# Patient Record
Sex: FEMALE | Race: ASIAN | Employment: UNEMPLOYED | URBAN - METROPOLITAN AREA
[De-identification: names, ages, dates, MRNs, and addresses within clinical notes are randomized per-mention and may not be internally consistent; named-entity substitution may affect disease eponyms.]

---

## 2017-11-09 ENCOUNTER — ALLSCRIPTS OFFICE VISIT (OUTPATIENT)
Dept: OTHER | Facility: OTHER | Age: 57
End: 2017-11-09

## 2017-11-10 NOTE — PROGRESS NOTES
Assessment    1  High blood pressure (401 9) (I10)   2  Pre-operative clearance (V72 84) (Z01 818)    Plan  Pre-operative clearance    · Follow-up visit in 6 months Evaluation and Treatment  Follow-up  Status: Hold For - Scheduling Requested for: [id number]    Discussion/Summary  Discussion Summary:   Preoperative clearance: Patient physically seems to be quite stable with normal blood pressure normal vitals normal physical examination  She is supposed to have blood work EKG and chest x-ray done today  I will get copy of the blood work  But based on examination today she is medically low risk for the total hysterectomy  very well control  fibroid: For total hysterectomy  advised her to continue blood pressure medication  Avoid any aspirin due to 5 days before surgery  is medically low risk for hysterectomy  Once I get blood work EKG and chest x-ray if something is different I'll convey it to you  you very much for the consultation  Counseling Documentation With Imm: The patient was counseled regarding diagnostic results  Medication SE Review and Pt Understands Tx: Possible side effects of new medications were reviewed with the patient/guardian today  Chief Complaint  Chief Complaint Free Text Note Form: Debrah Dubin came in today for medical clearance for further hysterectomy surgery      History of Present Illness  HPI: 49-year-old woman with main probable hypertensive was found to have uterine fibroid and need total hysterectomy so she is here for the medical clearance  is feeling quite well denies any complaint  Taking medicine regularly      Review of Systems  Complete-Female:  Constitutional: No fever, no chills, feels well, no tiredness, no recent weight gain or weight loss  Eyes: No complaints of eye pain, no red eyes, no eyesight problems, no discharge, no dry eyes, no itching of eyes  ENT: no complaints of earache, no loss of hearing, no nose bleeds, no nasal discharge, no sore throat, no hoarseness  Cardiovascular: No complaints of slow heart rate, no fast heart rate, no chest pain, no palpitations, no leg claudication, no lower extremity edema  Respiratory: No complaints of shortness of breath, no wheezing, no cough, no SOB on exertion, no orthopnea, no PND  Gastrointestinal: No complaints of abdominal pain, no constipation, no nausea or vomiting, no diarrhea, no bloody stools  Genitourinary: No complaints of dysuria, no incontinence, no pelvic pain, no dysmenorrhea, no vaginal discharge or bleeding  Musculoskeletal: No complaints of arthralgias, no myalgias, no joint swelling or stiffness, no limb pain or swelling  Integumentary: No complaints of skin rash or lesions, no itching, no skin wounds, no breast pain or lump  Psychiatric: Not suicidal, no sleep disturbance, no anxiety or depression, no change in personality, no emotional problems  Endocrine: No complaints of proptosis, no hot flashes, no muscle weakness, no deepening of the voice, no feelings of weakness  Hematologic/Lymphatic: No complaints of swollen glands, no swollen glands in the neck, does not bleed easily, does not bruise easily  ROS Reviewed:   ROS reviewed  Active Problems  1  Anemia due to blood loss (280 0) (D50 0)   2  DJD (degenerative joint disease), multiple sites (715 89) (M15 9)   3  Fatigue (780 79) (R53 83)   4  Head ache (784 0) (R51)   5  Heel spur (726 73) (M77 30)   6  Hemorrhoids (455 6) (K64 9)   7  High blood pressure (401 9) (I10)   8  Idiopathic eosinophilia (288 3) (D72 1)   9  Multiple joint pain (719 49) (M25 50)   10  Situational stress (V62 89) (F43 9)    Past Medical History  1  History of Abnormal uterine bleeding (626 9) (N93 9)   2  History of Benign essential hypertension (401 1) (I10)   3  History of Colonoscopy   4  History of DJD (degenerative joint disease) (715 90) (M19 90)  Active Problems And Past Medical History Reviewed:    The active problems and past medical history were reviewed and updated today  Surgical History  1  Denied: History Of Prior Surgery  Surgical History Reviewed: The surgical history was reviewed and updated today  Family History  Mother    1  Family history of Hypertension  Father    2  Family history of hypertension (V17 49) (Z82 49)   3  Family history of renal failure (V18 69) (Z84 1)  Family History Reviewed: The family history was reviewed and updated today  Social History     · Active advance directive (V49 89) (Z78 9)   · Employed   · Exercises moderately 3 or more times a week   · Exercises occasionally (V49 89) (Z78 9)   ·    · Never a smoker   · Never used chewing tobacco (V49 89) (Z78 9)   · No advance directives (V49 89) (Z78 9)   · No alcohol use   · Non-smoker (V49 89) (Z78 9)   · Occasional caffeine consumption   · Occupation   · Two children  Social History Reviewed: The social history was reviewed and updated today  Current Meds   1  Glucosamine-Chondroitin--300-250 MG TABS; Take 1 tablet daily; Therapy: (Recorded:[id number]) to Recorded   2  Metoprolol Succinate ER 50 MG Oral Tablet Extended Release 24 Hour; TAKE 1 TABLET DAILY; Therapy: [id number] to (Evaluate:09Jun2018); Last Rx:14Jun2017 Ordered   3  Multivitamins TABS; TAKE 1 TABLET DAILY; Therapy: (Adan Angel) to Recorded   4  Vitamin D TABS; Take 1 tablet daily; Therapy: (Recorded:28Jun2016) to Recorded  Medication List Reviewed: The medication list was reviewed and updated today  Allergies  1  No Known Drug Allergies    Vitals  Vital Signs    Recorded: [id number] 11:06AM Recorded: 72SCU0191 10:45AM   Temperature  97 5 F   Heart Rate 78, Apical    Pulse Quality Normal, Apical    Respiration Quality Normal    Respiration 16    Height  5 ft 1 in   Weight  160 lb 8 oz   BMI Calculated  30 33   BSA Calculated  1 72       Physical Exam   Constitutional  General appearance: No acute distress, well appearing and well nourished     Eyes  Conjunctiva and lids: No swelling, erythema or discharge  Pupils and irises: Equal, round and reactive to light  Ears, Nose, Mouth, and Throat  External inspection of ears and nose: Normal    Pulmonary  Respiratory effort: No increased work of breathing or signs of respiratory distress  Auscultation of lungs: Clear to auscultation  Cardiovascular  Auscultation of heart: Normal rate and rhythm, normal S1 and S2, without murmurs  Examination of extremities for edema and/or varicosities: Normal    Carotid pulses: Normal    Abdomen  Abdomen: Non-tender, no masses  Liver and spleen: No hepatomegaly or splenomegaly  Lymphatic  Palpation of lymph nodes in neck: No lymphadenopathy  Musculoskeletal  Gait and station: Normal    Psychiatric  Orientation to person, place, and time: Normal          Future Appointments    Date/Time Provider Specialty Site   01/02/2018 04:00 PM CONCETTA [place]  Ne[facility]rology [facility]   05/09/2018 04:30 PM CONCETTA Everett   Nephrology [facility]       Signatures   Electronically signed by : CONCETTA Aparicio ; Nov 9 2017 11:48AM EST                       (Author)

## 2018-01-12 NOTE — CONSULTS
Chief Complaint  Vinayak Chung came in today for medical clearance for further hysterectomy surgery      History of Present Illness  HPI: 27-year-old woman with main probable hypertensive was found to have uterine fibroid and need total hysterectomy so she is here for the medical clearance    She is feeling quite well denies any complaint  Taking medicine regularly      Review of Systems    Constitutional: No fever, no chills, feels well, no tiredness, no recent weight gain or weight loss  Eyes: No complaints of eye pain, no red eyes, no eyesight problems, no discharge, no dry eyes, no itching of eyes  ENT: no complaints of earache, no loss of hearing, no nose bleeds, no nasal discharge, no sore throat, no hoarseness  Cardiovascular: No complaints of slow heart rate, no fast heart rate, no chest pain, no palpitations, no leg claudication, no lower extremity edema  Respiratory: No complaints of shortness of breath, no wheezing, no cough, no SOB on exertion, no orthopnea, no PND  Gastrointestinal: No complaints of abdominal pain, no constipation, no nausea or vomiting, no diarrhea, no bloody stools  Genitourinary: No complaints of dysuria, no incontinence, no pelvic pain, no dysmenorrhea, no vaginal discharge or bleeding  Musculoskeletal: No complaints of arthralgias, no myalgias, no joint swelling or stiffness, no limb pain or swelling  Integumentary: No complaints of skin rash or lesions, no itching, no skin wounds, no breast pain or lump  Psychiatric: Not suicidal, no sleep disturbance, no anxiety or depression, no change in personality, no emotional problems  Endocrine: No complaints of proptosis, no hot flashes, no muscle weakness, no deepening of the voice, no feelings of weakness  Hematologic/Lymphatic: No complaints of swollen glands, no swollen glands in the neck, does not bleed easily, does not bruise easily  ROS reviewed  Active Problems    1   Anemia due to blood loss (280 0) (D50 0)   2  DJD (degenerative joint disease), multiple sites (715 89) (M15 9)   3  Fatigue (780 79) (R53 83)   4  Head ache (784 0) (R51)   5  Heel spur (726 73) (M77 30)   6  Hemorrhoids (455 6) (K64 9)   7  High blood pressure (401 9) (I10)   8  Idiopathic eosinophilia (288 3) (D72 1)   9  Multiple joint pain (719 49) (M25 50)   10  Situational stress (V62 89) (F43 9)    Past Medical History    · History of Abnormal uterine bleeding (626 9) (N93 9)   · History of Benign essential hypertension (401 1) (I10)   · History of Colonoscopy   · History of DJD (degenerative joint disease) (715 90) (M19 90)    The active problems and past medical history were reviewed and updated today  Surgical History    · Denied: History Of Prior Surgery    The surgical history was reviewed and updated today  Family History    · Family history of Hypertension    · Family history of hypertension (V17 49) (Z82 49)   · Family history of renal failure (V18 69) (Z84 1)    The family history was reviewed and updated today  Social History    · Active advance directive (V49 89) (Z78 9)   · No   · Employed   · [facility]   · Exercises moderately 3 or more times a week   · Exercises occasionally (V49 89) (Z78 9)   · Moderate   ·    · Never a smoker   · Never used chewing tobacco (V49 89) (Z78 9)   · No advance directives (V49 89) (Z78 9)   · No alcohol use   · Non-smoker (V49 89) (Z78 9)   · Occasional caffeine consumption   · Occupation   ·    · Two children  The social history was reviewed and updated today  Current Meds   1  Glucosamine-Chondroitin--300-250 MG TABS; Take 1 tablet daily; Therapy: (Recorded:03May2016) to Recorded   2  Metoprolol Succinate ER 50 MG Oral Tablet Extended Release 24 Hour; TAKE 1 TABLET   DAILY; Therapy: [id number] to (Evaluate:09Jun2018); Last Rx:14Jun2017 Ordered   3  Multivitamins TABS; TAKE 1 TABLET DAILY;    Therapy: (Recorded:03May2016) to Recorded   4  Vitamin D TABS; Take 1 tablet daily; Therapy: (Recorded:33Mdn9649) to Recorded    The medication list was reviewed and updated today  Allergies    1  No Known Drug Allergies    Vitals  Signs    Heart Rate: 78, ApicalPulse Quality: Normal, ApicalRespiration Quality: NormalRespiration: 16   Temperature: 97 5 FHeight: 5 ft 1 inWeight: 160 lb 8 ozBMI Calculated: 30 33BSA Calculated: 1 72    Physical Exam    Constitutional   General appearance: No acute distress, well appearing and well nourished  Eyes   Conjunctiva and lids: No swelling, erythema or discharge  Pupils and irises: Equal, round and reactive to light  Ears, Nose, Mouth, and Throat   External inspection of ears and nose: Normal     Pulmonary   Respiratory effort: No increased work of breathing or signs of respiratory distress  Auscultation of lungs: Clear to auscultation  Cardiovascular   Auscultation of heart: Normal rate and rhythm, normal S1 and S2, without murmurs  Examination of extremities for edema and/or varicosities: Normal     Carotid pulses: Normal     Abdomen   Abdomen: Non-tender, no masses  Liver and spleen: No hepatomegaly or splenomegaly  Lymphatic   Palpation of lymph nodes in neck: No lymphadenopathy  Musculoskeletal   Gait and station: Normal     Psychiatric   Orientation to person, place, and time: Normal          Assessment    1  High blood pressure (401 9) (I10)   2  Pre-operative clearance (V72 84) (Z01 818)    Plan     Pre-operative clearance    · Follow-up visit in 6 months Evaluation and Treatment  Follow-up  Status: Hold For -  Scheduling  Requested for: [id number]    Discussion/Summary    Preoperative clearance: Patient physically seems to be quite stable with normal blood pressure normal vitals normal physical examination  She is supposed to have blood work EKG and chest x-ray done today  I will get copy of the blood work   But based on examination today she is medically low risk for the total hysterectomy  Hypertension very well control    Uterine fibroid: For total hysterectomy    I advised her to continue blood pressure medication  Avoid any aspirin due to 5 days before surgery    She is medically low risk for hysterectomy  Once I get blood work EKG and chest x-ray if something is different I'll convey it to you    Thank you very much for the consultation  The patient was counseled regarding diagnostic results  Possible side effects of new medications were reviewed with the patient/guardian today        Signatures   Electronically signed by : CONCETTA Anderson ; Nov 9 2017 11:48AM EST                       (Author)

## 2018-01-15 VITALS
TEMPERATURE: 97.5 F | RESPIRATION RATE: 16 BRPM | WEIGHT: 160.5 LBS | HEART RATE: 78 BPM | BODY MASS INDEX: 30.3 KG/M2 | HEIGHT: 61 IN

## 2018-01-25 DIAGNOSIS — I10 HYPERTENSION, UNSPECIFIED TYPE: Primary | ICD-10-CM

## 2018-01-25 RX ORDER — METOPROLOL SUCCINATE 50 MG/1
50 TABLET, EXTENDED RELEASE ORAL DAILY
Qty: 90 TABLET | Refills: 0 | Status: SHIPPED | OUTPATIENT
Start: 2018-01-25 | End: 2018-05-14 | Stop reason: SDUPTHER

## 2018-01-25 RX ORDER — METOPROLOL SUCCINATE 50 MG/1
1 TABLET, EXTENDED RELEASE ORAL DAILY
COMMUNITY
Start: 2017-06-05 | End: 2018-01-25 | Stop reason: SDUPTHER

## 2018-01-31 ENCOUNTER — OFFICE VISIT (OUTPATIENT)
Dept: NEPHROLOGY | Facility: CLINIC | Age: 58
End: 2018-01-31
Payer: COMMERCIAL

## 2018-01-31 VITALS
BODY MASS INDEX: 29.85 KG/M2 | SYSTOLIC BLOOD PRESSURE: 120 MMHG | TEMPERATURE: 98.7 F | WEIGHT: 158 LBS | HEART RATE: 78 BPM | DIASTOLIC BLOOD PRESSURE: 70 MMHG

## 2018-01-31 DIAGNOSIS — I10 HYPERTENSION, UNSPECIFIED TYPE: Primary | ICD-10-CM

## 2018-01-31 DIAGNOSIS — R73.9 HYPERGLYCEMIA: ICD-10-CM

## 2018-01-31 PROBLEM — D25.9 UTERINE LEIOMYOMA: Status: ACTIVE | Noted: 2017-09-13

## 2018-01-31 PROCEDURE — 99214 OFFICE O/P EST MOD 30 MIN: CPT | Performed by: INTERNAL MEDICINE

## 2018-01-31 RX ORDER — SENNA AND DOCUSATE SODIUM 50; 8.6 MG/1; MG/1
1 TABLET, FILM COATED ORAL
COMMUNITY
Start: 2017-11-16

## 2018-01-31 NOTE — ASSESSMENT & PLAN NOTE
Blood pressure is very well control with metoprolol and she is tolerating medicine very well so I will continue at same dose with advised to monitor blood pressure at home    Continue diet and exercise and avoid salt

## 2018-01-31 NOTE — PROGRESS NOTES
INTERNAL MEDICINE OFFICE FOLLOW UP  Edwar Balderrama 59 y o  female MRN: [id number]    Encounter: [id number] 1/31/2018    REASON FOR VISIT: Edwar Balderrama is a 59 y o  female who is here on 1/31/2018 for No chief complaint on file  Aric [place] HPI:    HPI  Patient came in today for comprehensive physical examination  She is 53-year-old related early healthy young woman with problem high blood pressure  Doing quite well  Since her last visit she had required total hysterectomy because of fibroids  She tolerated surgery very well and other than mild abdominal pain she denies any complaint  REVIEW OF SYSTEMS:    Review of Systems   Constitutional: Negative for activity change and fatigue  HENT: Negative for congestion and ear discharge  Eyes: Negative for photophobia and pain  Respiratory: Negative for apnea and choking  Cardiovascular: Negative for chest pain and palpitations  Gastrointestinal: Negative for abdominal distention and blood in stool  Endocrine: Negative for heat intolerance and polyphagia  Genitourinary: Negative for flank pain and urgency  Musculoskeletal: Negative for neck pain and neck stiffness  Skin: Negative for color change and wound  Allergic/Immunologic: Negative for food allergies and immunocompromised state  Neurological: Negative for seizures and facial asymmetry  Hematological: Negative for adenopathy  Does not bruise/bleed easily  Psychiatric/Behavioral: Negative for self-injury and suicidal ideas           PAST MEDICAL HISTORY:  Past Medical History:   Diagnosis Date    Anemia     Hypertension        PAST SURGICAL HISTORY:  Past Surgical History:   Procedure Laterality Date    COLONOSCOPY N/A 2015    HYSTERECTOMY  11/2017       SOCIAL HISTORY:  History   Alcohol Use No     History   Drug Use No     History   Smoking Status    Never Smoker   Smokeless Tobacco    Never Used       FAMILY HISTORY:  Family History   Problem Relation Age of Onset    Hypertension Mother  Hypertension Father        MEDICATIONS:    Current Outpatient Prescriptions:     cholecalciferol (VITAMIN D3) 1,000 units tablet, Take 1 tablet by mouth daily, Disp: , Rfl:     Glucosamine-Chondroitin--300-250 MG TABS, Take 1 tablet by mouth daily, Disp: , Rfl:     metoprolol succinate (TOPROL-XL) 50 mg 24 hr tablet, Take 1 tablet by mouth daily, Disp: 90 tablet, Rfl: 0    Multiple Vitamin (MULTIVITAMINS PO), Take 1 tablet by mouth daily, Disp: , Rfl:     senna-docusate sodium (SENOKOT-S) 8 6-50 mg per tablet, Take 1 tablet by mouth, Disp: , Rfl:     PHYSICAL EXAM:  Vitals:    01/31/18 1556   BP: 120/70   BP Location: Right arm   Patient Position: Sitting   Pulse: 78   Temp: 98 7 °F (37 1 °C)   Weight: 71 7 kg (158 lb)     Body mass index is 29 85 kg/m²  Physical Exam   Constitutional: She is oriented to person, place, and time  She appears well-developed  No distress  HENT:   Head: Normocephalic  Mouth/Throat: Oropharynx is clear and moist    Eyes: Conjunctivae are normal  No scleral icterus  Neck: Neck supple  No JVD present  Cardiovascular: Normal rate and normal heart sounds  Pulmonary/Chest: Effort normal  She has no wheezes  Abdominal: Soft  There is no tenderness  Musculoskeletal: Normal range of motion  She exhibits no edema  Neurological: She is alert and oriented to person, place, and time  Skin: Skin is warm  No rash noted  Psychiatric: She has a normal mood and affect  Her behavior is normal        LAB RESULTS:  No results found for this or any previous visit  ASSESSMENT and PLAN:  High blood pressure  Blood pressure is very well control with metoprolol and she is tolerating medicine very well so I will continue at same dose with advised to monitor blood pressure at home  Continue diet and exercise and avoid salt    Uterine leiomyoma  Status post total hysterectomy    Still complaining of some abdominal pain but overall stable    DJD (degenerative joint disease), multiple sites  Quite asymptomatic does not require any pain killer    Hyperglycemia  Blood glucose was high last blood work maybe it was related to surgery or may be nonfasting  I will repeat blood work along with hemoglobin A1c for continuous monitoring      I will see her back in 6 month  She will get blood work and if there is any other complaints he will give me a call        Portions of the record may have been created with voice recognition software  Occasional wrong word or "sound a like" substitutions may have occurred due to the inherent limitations of voice recognition software  Read the chart carefully and recognize, using context, where substitutions have occurred  If you have any questions, please contact the dictating provider

## 2018-01-31 NOTE — ASSESSMENT & PLAN NOTE
Blood glucose was high last blood work maybe it was related to surgery or may be nonfasting    I will repeat blood work along with hemoglobin A1c for continuous monitoring

## 2018-04-02 LAB — HBA1C MFR BLD HPLC: 5.7 %

## 2018-05-14 DIAGNOSIS — I10 HYPERTENSION, UNSPECIFIED TYPE: ICD-10-CM

## 2018-05-14 RX ORDER — METOPROLOL SUCCINATE 50 MG/1
50 TABLET, EXTENDED RELEASE ORAL DAILY
Qty: 90 TABLET | Refills: 2 | Status: SHIPPED | OUTPATIENT
Start: 2018-05-14 | End: 2019-01-16 | Stop reason: SDUPTHER

## 2018-11-05 ENCOUNTER — TRANSCRIBE ORDERS (OUTPATIENT)
Dept: LAB | Facility: CLINIC | Age: 58
End: 2018-11-05

## 2018-11-05 ENCOUNTER — APPOINTMENT (OUTPATIENT)
Dept: LAB | Facility: CLINIC | Age: 58
End: 2018-11-05
Payer: COMMERCIAL

## 2018-11-05 DIAGNOSIS — I10 HYPERTENSION, UNSPECIFIED TYPE: ICD-10-CM

## 2018-11-05 DIAGNOSIS — R73.9 HYPERGLYCEMIA: ICD-10-CM

## 2018-11-05 DIAGNOSIS — I10 ESSENTIAL HYPERTENSION, MALIGNANT: Primary | ICD-10-CM

## 2018-11-05 DIAGNOSIS — I10 ESSENTIAL HYPERTENSION, MALIGNANT: ICD-10-CM

## 2018-11-05 LAB
ANION GAP SERPL CALCULATED.3IONS-SCNC: 4 MMOL/L (ref 4–13)
BACTERIA UR QL AUTO: ABNORMAL /HPF
BILIRUB UR QL STRIP: NEGATIVE
BUN SERPL-MCNC: 10 MG/DL (ref 5–25)
CALCIUM SERPL-MCNC: 9.1 MG/DL (ref 8.3–10.1)
CHLORIDE SERPL-SCNC: 102 MMOL/L (ref 100–108)
CHOLEST SERPL-MCNC: 169 MG/DL (ref 50–200)
CLARITY UR: CLEAR
CO2 SERPL-SCNC: 28 MMOL/L (ref 21–32)
COLOR UR: YELLOW
CREAT SERPL-MCNC: 0.74 MG/DL (ref 0.6–1.3)
ERYTHROCYTE [DISTWIDTH] IN BLOOD BY AUTOMATED COUNT: 12.8 % (ref 11.6–15.1)
EST. AVERAGE GLUCOSE BLD GHB EST-MCNC: 131 MG/DL
GFR SERPL CREATININE-BSD FRML MDRD: 88 ML/MIN/1.73SQ M
GLUCOSE P FAST SERPL-MCNC: 101 MG/DL (ref 65–99)
GLUCOSE UR STRIP-MCNC: NEGATIVE MG/DL
HBA1C MFR BLD: 6.2 % (ref 4.2–6.3)
HCT VFR BLD AUTO: 40.1 % (ref 34.8–46.1)
HDLC SERPL-MCNC: 47 MG/DL (ref 40–60)
HGB BLD-MCNC: 12.3 G/DL (ref 11.5–15.4)
HGB UR QL STRIP.AUTO: ABNORMAL
HYALINE CASTS #/AREA URNS LPF: ABNORMAL /LPF
KETONES UR STRIP-MCNC: NEGATIVE MG/DL
LDLC SERPL CALC-MCNC: 95 MG/DL (ref 0–100)
LEUKOCYTE ESTERASE UR QL STRIP: ABNORMAL
MCH RBC QN AUTO: 27.6 PG (ref 26.8–34.3)
MCHC RBC AUTO-ENTMCNC: 30.7 G/DL (ref 31.4–37.4)
MCV RBC AUTO: 90 FL (ref 82–98)
NITRITE UR QL STRIP: NEGATIVE
NON-SQ EPI CELLS URNS QL MICRO: ABNORMAL /HPF
NONHDLC SERPL-MCNC: 122 MG/DL
PH UR STRIP.AUTO: 6 [PH] (ref 4.5–8)
PLATELET # BLD AUTO: 297 THOUSANDS/UL (ref 149–390)
PMV BLD AUTO: 10.1 FL (ref 8.9–12.7)
POTASSIUM SERPL-SCNC: 4.2 MMOL/L (ref 3.5–5.3)
PROT UR STRIP-MCNC: NEGATIVE MG/DL
RBC # BLD AUTO: 4.45 MILLION/UL (ref 3.81–5.12)
RBC #/AREA URNS AUTO: ABNORMAL /HPF
SODIUM SERPL-SCNC: 134 MMOL/L (ref 136–145)
SP GR UR STRIP.AUTO: 1.02 (ref 1–1.03)
TRIGL SERPL-MCNC: 133 MG/DL
UROBILINOGEN UR QL STRIP.AUTO: 0.2 E.U./DL
WBC # BLD AUTO: 4.9 THOUSAND/UL (ref 4.31–10.16)
WBC #/AREA URNS AUTO: ABNORMAL /HPF

## 2018-11-05 PROCEDURE — 81001 URINALYSIS AUTO W/SCOPE: CPT

## 2018-11-05 PROCEDURE — 36415 COLL VENOUS BLD VENIPUNCTURE: CPT

## 2018-11-05 PROCEDURE — 80061 LIPID PANEL: CPT

## 2018-11-05 PROCEDURE — 80048 BASIC METABOLIC PNL TOTAL CA: CPT

## 2018-11-05 PROCEDURE — 85027 COMPLETE CBC AUTOMATED: CPT

## 2018-11-05 PROCEDURE — 83036 HEMOGLOBIN GLYCOSYLATED A1C: CPT

## 2018-11-06 ENCOUNTER — OFFICE VISIT (OUTPATIENT)
Dept: NEPHROLOGY | Facility: CLINIC | Age: 58
End: 2018-11-06
Payer: COMMERCIAL

## 2018-11-06 VITALS — BODY MASS INDEX: 29.78 KG/M2 | WEIGHT: 157.6 LBS | TEMPERATURE: 98.7 F

## 2018-11-06 DIAGNOSIS — D25.9 UTERINE LEIOMYOMA, UNSPECIFIED LOCATION: ICD-10-CM

## 2018-11-06 DIAGNOSIS — R63.5 WEIGHT GAIN: ICD-10-CM

## 2018-11-06 DIAGNOSIS — I10 ESSENTIAL HYPERTENSION: Primary | ICD-10-CM

## 2018-11-06 DIAGNOSIS — E87.1 HYPONATREMIA: ICD-10-CM

## 2018-11-06 DIAGNOSIS — M15.9 OSTEOARTHRITIS OF MULTIPLE JOINTS, UNSPECIFIED OSTEOARTHRITIS TYPE: ICD-10-CM

## 2018-11-06 DIAGNOSIS — R73.9 HYPERGLYCEMIA: ICD-10-CM

## 2018-11-06 PROCEDURE — 99213 OFFICE O/P EST LOW 20 MIN: CPT | Performed by: INTERNAL MEDICINE

## 2018-11-06 NOTE — ASSESSMENT & PLAN NOTE
Hemoglobin A1c 6 2 which is still well controlled without medication but it is increasing and I discussed that with her    Advised to continue diet and exercise

## 2018-11-06 NOTE — ASSESSMENT & PLAN NOTE
She is quite frustrated as she is doing regular exercise watching diet still cannot lose weight    I will check TSH to rule out any hypothyroidism

## 2018-11-06 NOTE — PROGRESS NOTES
INTERNAL MEDICINE OFFICE FOLLOW UP  Romero Galvez 59 y o  female MRN: [id number]    Encounter: [id number] 11/6/2018    REASON FOR VISIT: Romero Galvez is a 59 y o  female who is here on 11/6/2018 for Follow-up and Hypertension    HPI:    Grzegorz Mascorro came in today for follow-up of hypertension and hyperglycemia  She is overall doing well  Frustrated as she cannot lose weight with diet and exercise which she is doing  No other acute complaint        REVIEW OF SYSTEMS:    Review of Systems   Constitutional: Negative for activity change and fatigue  HENT: Negative for congestion and ear discharge  Eyes: Negative for photophobia and pain  Respiratory: Negative for apnea, choking, chest tightness, shortness of breath and wheezing  Cardiovascular: Negative for chest pain, palpitations and leg swelling  Gastrointestinal: Negative for abdominal distention, abdominal pain, blood in stool, constipation and diarrhea  Endocrine: Negative for heat intolerance and polyphagia  Genitourinary: Negative for decreased urine volume, difficulty urinating, flank pain and urgency  Musculoskeletal: Negative for neck pain and neck stiffness  Skin: Negative for color change and wound  Allergic/Immunologic: Negative for food allergies and immunocompromised state  Neurological: Negative for seizures and facial asymmetry  Hematological: Negative for adenopathy  Does not bruise/bleed easily  Psychiatric/Behavioral: Negative for self-injury and suicidal ideas           PAST MEDICAL HISTORY:  Past Medical History:   Diagnosis Date    Anemia     Diabetes mellitus ([address] )     Hypertension        PAST SURGICAL HISTORY:  Past Surgical History:   Procedure Laterality Date    COLONOSCOPY N/A 2015    HYSTERECTOMY  11/2017       SOCIAL HISTORY:  History   Alcohol Use No     History   Drug Use No     History   Smoking Status    Never Smoker   Smokeless Tobacco    Never Used       FAMILY HISTORY:  Family History   Problem Relation Age of Onset    Hypertension Mother     Hypertension Father        MEDICATIONS:    Current Outpatient Prescriptions:     cholecalciferol (VITAMIN D3) 1,000 units tablet, Take 1 tablet by mouth daily, Disp: , Rfl:     Glucosamine-Chondroitin--300-250 MG TABS, Take 1 tablet by mouth daily, Disp: , Rfl:     metoprolol succinate (TOPROL-XL) 50 mg 24 hr tablet, Take 1 tablet (50 mg total) by mouth daily, Disp: 90 tablet, Rfl: 2    Multiple Vitamin (MULTIVITAMINS PO), Take 1 tablet by mouth daily, Disp: , Rfl:     senna-docusate sodium (SENOKOT-S) 8 6-50 mg per tablet, Take 1 tablet by mouth, Disp: , Rfl:     PHYSICAL EXAM:  Vitals:    11/06/18 1558   Temp: 98 7 °F (37 1 °C)   TempSrc: Tympanic   Weight: 71 5 kg (157 lb 9 6 oz)     Body mass index is 29 78 kg/m²  Physical Exam   Constitutional: She is oriented to person, place, and time  She appears well-developed  No distress  HENT:   Head: Normocephalic and atraumatic  Mouth/Throat: Oropharynx is clear and moist    Eyes: Pupils are equal, round, and reactive to light  Conjunctivae are normal  Right eye exhibits no discharge  No scleral icterus  Neck: Neck supple  No JVD present  Cardiovascular: Normal rate, regular rhythm and normal heart sounds  No murmur heard  Pulmonary/Chest: Effort normal and breath sounds normal  No respiratory distress  She has no wheezes  She has no rales  Abdominal: Soft  Bowel sounds are normal  She exhibits no distension and no mass  There is no tenderness  Musculoskeletal: Normal range of motion  She exhibits no edema  Neurological: She is alert and oriented to person, place, and time  Skin: Skin is warm  No rash noted  Psychiatric: She has a normal mood and affect   Her behavior is normal        LAB RESULTS:  Results for orders placed or performed in visit on 47/80/74   Basic metabolic panel   Result Value Ref Range    Sodium 134 (L) 136 - 145 mmol/L    Potassium 4 2 3 5 - 5 3 mmol/L    Chloride 102 100 - 108 mmol/L    CO2 28 21 - 32 mmol/L    ANION GAP 4 4 - 13 mmol/L    BUN 10 5 - 25 mg/dL    Creatinine 0 74 0 60 - 1 30 mg/dL    Glucose, Fasting 101 (H) 65 - 99 mg/dL    Calcium 9 1 8 3 - 10 1 mg/dL    eGFR 88 ml/min/1 73sq m   CBC   Result Value Ref Range    WBC 4 90 4 31 - 10 16 Thousand/uL    RBC 4 45 3 81 - 5 12 Million/uL    Hemoglobin 12 3 11 5 - 15 4 g/dL    Hematocrit 40 1 34 8 - 46 1 %    MCV 90 82 - 98 fL    MCH 27 6 26 8 - 34 3 pg    MCHC 30 7 (L) 31 4 - 37 4 g/dL    RDW 12 8 11 6 - 15 1 %    Platelets 425 868 - 882 Thousands/uL    MPV 10 1 8 9 - 12 7 fL   Urinalysis with reflex to microscopic   Result Value Ref Range    Color, UA Yellow     Clarity, UA Clear     Specific [place], UA 1 016 1 003 - 1 030    pH, UA 6 0 4 5 - 8 0    Leukocytes, UA Small (A) Negative    Nitrite, UA Negative Negative    Protein, UA Negative Negative mg/dl    Glucose, UA Negative Negative mg/dl    Ketones, UA Negative Negative mg/dl    Urobilinogen, UA 0 2 0 2, 1 0 E U /dl E U /dl    Bilirubin, UA Negative Negative    Blood, UA Trace (A) Negative   Hemoglobin A1c   Result Value Ref Range    Hemoglobin A1C 6 2 4 2 - 6 3 %     mg/dl   Lipid panel   Result Value Ref Range    Cholesterol 169 50 - 200 mg/dL    Triglycerides 133 <=150 mg/dL    HDL, Direct 47 40 - 60 mg/dL    LDL Calculated 95 0 - 100 mg/dL    Non-HDL-Chol (CHOL-HDL) 122 mg/dl   Urine Microscopic   Result Value Ref Range    RBC, UA 2-4 (A) None Seen, 0-5 /hpf    WBC, UA 2-4 (A) None Seen, 0-5, 5-55, 5-65 /hpf    Epithelial Cells None Seen None Seen, Occasional /hpf    Bacteria, UA None Seen None Seen, Occasional /hpf    Hyaline Casts, UA None Seen None Seen /lpf       ASSESSMENT and PLAN:  High blood pressure  Very well controlled with present medication which I will continue    Uterine leiomyoma  Status post surgery and according to her she is doing quite well    Hyperglycemia  Hemoglobin A1c 6 2 which is still well controlled without medication but it is increasing and I discussed that with her  Advised to continue diet and exercise    Hyponatremia  I am not sure worse etiology  I will recheck it in 1 month  Fluid restriction discussed with her    Weight gain  She is quite frustrated as she is doing regular exercise watching diet still cannot lose weight  I will check TSH to rule out any hypothyroidism      I will see her back in 6 months for comprehensive physical examination and will get blood test before that visit       Portions of the record may have been created with voice recognition software  Occasional wrong word or "sound a like" substitutions may have occurred due to the inherent limitations of voice recognition software  Read the chart carefully and recognize, using context, where substitutions have occurred  If you have any questions, please contact the dictating provider

## 2018-12-12 ENCOUNTER — TRANSCRIBE ORDERS (OUTPATIENT)
Dept: LAB | Facility: CLINIC | Age: 58
End: 2018-12-12

## 2018-12-12 ENCOUNTER — APPOINTMENT (OUTPATIENT)
Dept: LAB | Facility: CLINIC | Age: 58
End: 2018-12-12
Payer: COMMERCIAL

## 2018-12-12 DIAGNOSIS — E87.1 HYPOSMOLALITY SYNDROME: ICD-10-CM

## 2018-12-12 DIAGNOSIS — E87.1 HYPOSMOLALITY SYNDROME: Primary | ICD-10-CM

## 2018-12-12 LAB
ANION GAP SERPL CALCULATED.3IONS-SCNC: 6 MMOL/L (ref 4–13)
BUN SERPL-MCNC: 11 MG/DL (ref 5–25)
CALCIUM SERPL-MCNC: 9 MG/DL (ref 8.3–10.1)
CHLORIDE SERPL-SCNC: 104 MMOL/L (ref 100–108)
CO2 SERPL-SCNC: 30 MMOL/L (ref 21–32)
CREAT SERPL-MCNC: 0.82 MG/DL (ref 0.6–1.3)
GFR SERPL CREATININE-BSD FRML MDRD: 78 ML/MIN/1.73SQ M
GLUCOSE P FAST SERPL-MCNC: 95 MG/DL (ref 65–99)
POTASSIUM SERPL-SCNC: 4.1 MMOL/L (ref 3.5–5.3)
SODIUM SERPL-SCNC: 140 MMOL/L (ref 136–145)
TSH SERPL DL<=0.05 MIU/L-ACNC: 2 UIU/ML (ref 0.36–3.74)

## 2018-12-12 PROCEDURE — 84443 ASSAY THYROID STIM HORMONE: CPT

## 2018-12-12 PROCEDURE — 80048 BASIC METABOLIC PNL TOTAL CA: CPT

## 2018-12-12 PROCEDURE — 36415 COLL VENOUS BLD VENIPUNCTURE: CPT

## 2019-01-16 DIAGNOSIS — I10 HYPERTENSION, UNSPECIFIED TYPE: ICD-10-CM

## 2019-01-16 RX ORDER — METOPROLOL SUCCINATE 50 MG/1
TABLET, EXTENDED RELEASE ORAL
Qty: 90 TABLET | Refills: 2 | Status: SHIPPED | OUTPATIENT
Start: 2019-01-16 | End: 2019-10-16 | Stop reason: SDUPTHER

## 2019-02-01 ENCOUNTER — TELEPHONE (OUTPATIENT)
Dept: NEPHROLOGY | Facility: CLINIC | Age: 59
End: 2019-02-01

## 2019-02-01 NOTE — TELEPHONE ENCOUNTER
Called and left a message on machine for patient to return our call about changing the time of her appointment for Friday 5/10/2019 6mo follow up

## 2019-06-03 ENCOUNTER — TELEPHONE (OUTPATIENT)
Dept: NEPHROLOGY | Facility: CLINIC | Age: 59
End: 2019-06-03

## 2019-06-03 DIAGNOSIS — N64.3 HYPERGALACTIA: ICD-10-CM

## 2019-06-03 DIAGNOSIS — I10 ESSENTIAL HYPERTENSION: Primary | ICD-10-CM

## 2019-06-04 ENCOUNTER — APPOINTMENT (OUTPATIENT)
Dept: LAB | Facility: CLINIC | Age: 59
End: 2019-06-04
Payer: COMMERCIAL

## 2019-06-04 DIAGNOSIS — I10 ESSENTIAL HYPERTENSION: ICD-10-CM

## 2019-06-04 LAB
ANION GAP SERPL CALCULATED.3IONS-SCNC: 4 MMOL/L (ref 4–13)
BACTERIA UR QL AUTO: ABNORMAL /HPF
BASOPHILS # BLD AUTO: 0.04 THOUSANDS/ΜL (ref 0–0.1)
BASOPHILS NFR BLD AUTO: 1 % (ref 0–1)
BILIRUB UR QL STRIP: NEGATIVE
BUN SERPL-MCNC: 11 MG/DL (ref 5–25)
CALCIUM SERPL-MCNC: 8.8 MG/DL (ref 8.3–10.1)
CHLORIDE SERPL-SCNC: 108 MMOL/L (ref 100–108)
CHOLEST SERPL-MCNC: 161 MG/DL (ref 50–200)
CLARITY UR: CLEAR
CO2 SERPL-SCNC: 28 MMOL/L (ref 21–32)
COLOR UR: YELLOW
CREAT SERPL-MCNC: 0.76 MG/DL (ref 0.6–1.3)
CREAT UR-MCNC: 84.8 MG/DL
EOSINOPHIL # BLD AUTO: 0.12 THOUSAND/ΜL (ref 0–0.61)
EOSINOPHIL NFR BLD AUTO: 2 % (ref 0–6)
ERYTHROCYTE [DISTWIDTH] IN BLOOD BY AUTOMATED COUNT: 12.6 % (ref 11.6–15.1)
EST. AVERAGE GLUCOSE BLD GHB EST-MCNC: 128 MG/DL
GFR SERPL CREATININE-BSD FRML MDRD: 85 ML/MIN/1.73SQ M
GLUCOSE P FAST SERPL-MCNC: 90 MG/DL (ref 65–99)
GLUCOSE UR STRIP-MCNC: NEGATIVE MG/DL
HBA1C MFR BLD: 6.1 % (ref 4.2–6.3)
HCT VFR BLD AUTO: 40.1 % (ref 34.8–46.1)
HDLC SERPL-MCNC: 49 MG/DL (ref 40–60)
HGB BLD-MCNC: 12.1 G/DL (ref 11.5–15.4)
HGB UR QL STRIP.AUTO: NEGATIVE
HYALINE CASTS #/AREA URNS LPF: ABNORMAL /LPF
IMM GRANULOCYTES # BLD AUTO: 0.02 THOUSAND/UL (ref 0–0.2)
IMM GRANULOCYTES NFR BLD AUTO: 0 % (ref 0–2)
KETONES UR STRIP-MCNC: NEGATIVE MG/DL
LDLC SERPL CALC-MCNC: 93 MG/DL (ref 0–100)
LEUKOCYTE ESTERASE UR QL STRIP: ABNORMAL
LYMPHOCYTES # BLD AUTO: 1.79 THOUSANDS/ΜL (ref 0.6–4.47)
LYMPHOCYTES NFR BLD AUTO: 34 % (ref 14–44)
MCH RBC QN AUTO: 27.3 PG (ref 26.8–34.3)
MCHC RBC AUTO-ENTMCNC: 30.2 G/DL (ref 31.4–37.4)
MCV RBC AUTO: 91 FL (ref 82–98)
MICROALBUMIN UR-MCNC: 8.5 MG/L (ref 0–20)
MICROALBUMIN/CREAT 24H UR: 10 MG/G CREATININE (ref 0–30)
MONOCYTES # BLD AUTO: 0.51 THOUSAND/ΜL (ref 0.17–1.22)
MONOCYTES NFR BLD AUTO: 10 % (ref 4–12)
NEUTROPHILS # BLD AUTO: 2.76 THOUSANDS/ΜL (ref 1.85–7.62)
NEUTS SEG NFR BLD AUTO: 53 % (ref 43–75)
NITRITE UR QL STRIP: NEGATIVE
NON-SQ EPI CELLS URNS QL MICRO: ABNORMAL /HPF
NONHDLC SERPL-MCNC: 112 MG/DL
NRBC BLD AUTO-RTO: 0 /100 WBCS
PH UR STRIP.AUTO: 6 [PH]
PLATELET # BLD AUTO: 273 THOUSANDS/UL (ref 149–390)
PMV BLD AUTO: 10 FL (ref 8.9–12.7)
POTASSIUM SERPL-SCNC: 4.4 MMOL/L (ref 3.5–5.3)
PROT UR STRIP-MCNC: NEGATIVE MG/DL
RBC # BLD AUTO: 4.43 MILLION/UL (ref 3.81–5.12)
RBC #/AREA URNS AUTO: ABNORMAL /HPF
SODIUM SERPL-SCNC: 140 MMOL/L (ref 136–145)
SP GR UR STRIP.AUTO: 1.02 (ref 1–1.03)
TRIGL SERPL-MCNC: 95 MG/DL
UROBILINOGEN UR QL STRIP.AUTO: 0.2 E.U./DL
WBC # BLD AUTO: 5.24 THOUSAND/UL (ref 4.31–10.16)
WBC #/AREA URNS AUTO: ABNORMAL /HPF

## 2019-06-04 PROCEDURE — 80061 LIPID PANEL: CPT | Performed by: INTERNAL MEDICINE

## 2019-06-04 PROCEDURE — 81001 URINALYSIS AUTO W/SCOPE: CPT | Performed by: INTERNAL MEDICINE

## 2019-06-04 PROCEDURE — 80048 BASIC METABOLIC PNL TOTAL CA: CPT | Performed by: INTERNAL MEDICINE

## 2019-06-04 PROCEDURE — 36415 COLL VENOUS BLD VENIPUNCTURE: CPT | Performed by: INTERNAL MEDICINE

## 2019-06-04 PROCEDURE — 83036 HEMOGLOBIN GLYCOSYLATED A1C: CPT | Performed by: INTERNAL MEDICINE

## 2019-06-04 PROCEDURE — 82570 ASSAY OF URINE CREATININE: CPT

## 2019-06-04 PROCEDURE — 85025 COMPLETE CBC W/AUTO DIFF WBC: CPT

## 2019-06-04 PROCEDURE — 82043 UR ALBUMIN QUANTITATIVE: CPT

## 2019-06-05 ENCOUNTER — OFFICE VISIT (OUTPATIENT)
Dept: NEPHROLOGY | Facility: CLINIC | Age: 59
End: 2019-06-05
Payer: COMMERCIAL

## 2019-06-05 VITALS
TEMPERATURE: 97.9 F | SYSTOLIC BLOOD PRESSURE: 120 MMHG | DIASTOLIC BLOOD PRESSURE: 70 MMHG | BODY MASS INDEX: 29.72 KG/M2 | RESPIRATION RATE: 14 BRPM | HEIGHT: 60 IN | WEIGHT: 151.4 LBS | HEART RATE: 68 BPM

## 2019-06-05 DIAGNOSIS — I10 ESSENTIAL HYPERTENSION: Primary | ICD-10-CM

## 2019-06-05 DIAGNOSIS — R73.9 HYPERGLYCEMIA: ICD-10-CM

## 2019-06-05 DIAGNOSIS — D25.9 UTERINE LEIOMYOMA, UNSPECIFIED LOCATION: ICD-10-CM

## 2019-06-05 DIAGNOSIS — R63.5 WEIGHT GAIN: ICD-10-CM

## 2019-06-05 PROCEDURE — 99213 OFFICE O/P EST LOW 20 MIN: CPT | Performed by: INTERNAL MEDICINE

## 2019-10-15 DIAGNOSIS — I10 HYPERTENSION, UNSPECIFIED TYPE: ICD-10-CM

## 2019-10-16 DIAGNOSIS — I10 HYPERTENSION, UNSPECIFIED TYPE: ICD-10-CM

## 2019-10-16 RX ORDER — METOPROLOL SUCCINATE 50 MG/1
50 TABLET, EXTENDED RELEASE ORAL DAILY
Qty: 90 TABLET | Refills: 4 | OUTPATIENT
Start: 2019-10-16

## 2019-10-16 RX ORDER — METOPROLOL SUCCINATE 50 MG/1
50 TABLET, EXTENDED RELEASE ORAL DAILY
Qty: 90 TABLET | Refills: 2 | Status: SHIPPED | OUTPATIENT
Start: 2019-10-16 | End: 2019-10-23 | Stop reason: SDUPTHER

## 2019-10-23 DIAGNOSIS — I10 HYPERTENSION, UNSPECIFIED TYPE: ICD-10-CM

## 2019-10-23 RX ORDER — METOPROLOL SUCCINATE 50 MG/1
50 TABLET, EXTENDED RELEASE ORAL DAILY
Qty: 90 TABLET | Refills: 2 | Status: SHIPPED | OUTPATIENT
Start: 2019-10-23

## 2019-10-23 NOTE — TELEPHONE ENCOUNTER
Can you change her home address to , [place], [address] [place] [address], [id number]  So that medicine can be delivered to her new address   I will sign afterwards

## 2020-06-25 ENCOUNTER — TELEPHONE (OUTPATIENT)
Dept: NEPHROLOGY | Facility: CLINIC | Age: 60
End: 2020-06-25

## 2020-07-19 DIAGNOSIS — I10 HYPERTENSION, UNSPECIFIED TYPE: ICD-10-CM

## 2020-07-19 RX ORDER — METOPROLOL SUCCINATE 50 MG/1
TABLET, EXTENDED RELEASE ORAL
Qty: 90 TABLET | Refills: 3 | OUTPATIENT
Start: 2020-07-19

## 2024-01-15 ENCOUNTER — NEW REFERRAL (OUTPATIENT)
Dept: URBAN - METROPOLITAN AREA CLINIC 105 | Facility: CLINIC | Age: 64
End: 2024-01-15

## 2024-01-15 VITALS — HEART RATE: 63 BPM | SYSTOLIC BLOOD PRESSURE: 148 MMHG | DIASTOLIC BLOOD PRESSURE: 92 MMHG

## 2024-01-15 DIAGNOSIS — H35.372: ICD-10-CM

## 2024-01-15 DIAGNOSIS — H40.013: ICD-10-CM

## 2024-01-15 DIAGNOSIS — H43.813: ICD-10-CM

## 2024-01-15 DIAGNOSIS — H34.8320: ICD-10-CM

## 2024-01-15 PROCEDURE — 99204 OFFICE O/P NEW MOD 45 MIN: CPT

## 2024-01-15 PROCEDURE — 92202 OPSCPY EXTND ON/MAC DRAW: CPT

## 2024-01-15 ASSESSMENT — TONOMETRY
OS_IOP_MMHG: 16
OD_IOP_MMHG: 15

## 2024-01-15 ASSESSMENT — VISUAL ACUITY
OD_CC: 20/20
OS_CC: 20/50
OD_CC: 20/50-2
OS_CC: 20/40-2

## 2024-03-14 ENCOUNTER — FOLLOW UP (OUTPATIENT)
Dept: URBAN - METROPOLITAN AREA CLINIC 105 | Facility: CLINIC | Age: 64
End: 2024-03-14

## 2024-03-14 DIAGNOSIS — H34.8320: ICD-10-CM

## 2024-03-14 DIAGNOSIS — H35.372: ICD-10-CM

## 2024-03-14 DIAGNOSIS — H40.013: ICD-10-CM

## 2024-03-14 DIAGNOSIS — H43.813: ICD-10-CM

## 2024-03-14 PROCEDURE — 92202 OPSCPY EXTND ON/MAC DRAW: CPT

## 2024-03-14 PROCEDURE — 92134 CPTRZ OPH DX IMG PST SGM RTA: CPT

## 2024-03-14 PROCEDURE — 92014 COMPRE OPH EXAM EST PT 1/>: CPT

## 2024-03-14 PROCEDURE — 92133 CPTRZD OPH DX IMG PST SGM ON: CPT

## 2024-03-14 ASSESSMENT — VISUAL ACUITY
OD_CC: 20/40-2
OS_CC: 20/40-2

## 2024-03-14 ASSESSMENT — TONOMETRY
OS_IOP_MMHG: 21
OD_IOP_MMHG: 20

## 2024-06-04 ENCOUNTER — FOLLOW UP (OUTPATIENT)
Dept: URBAN - METROPOLITAN AREA CLINIC 8 | Facility: CLINIC | Age: 64
End: 2024-06-04

## 2024-06-04 DIAGNOSIS — H40.013: ICD-10-CM

## 2024-06-04 DIAGNOSIS — H35.372: ICD-10-CM

## 2024-06-04 DIAGNOSIS — H43.813: ICD-10-CM

## 2024-06-04 DIAGNOSIS — H34.8320: ICD-10-CM

## 2024-06-04 DIAGNOSIS — H52.13: ICD-10-CM

## 2024-06-04 DIAGNOSIS — H25.13: ICD-10-CM

## 2024-06-04 PROCEDURE — 92012 INTRM OPH EXAM EST PATIENT: CPT

## 2024-06-04 PROCEDURE — 92202 OPSCPY EXTND ON/MAC DRAW: CPT

## 2024-06-04 PROCEDURE — 92134 CPTRZ OPH DX IMG PST SGM RTA: CPT

## 2024-06-04 ASSESSMENT — VISUAL ACUITY
OD_CC: 20/60
OS_CC: 20/25-2

## 2024-06-04 ASSESSMENT — TONOMETRY
OS_IOP_MMHG: 17
OD_IOP_MMHG: 13

## 2024-08-13 ENCOUNTER — FOLLOW UP (OUTPATIENT)
Dept: URBAN - METROPOLITAN AREA CLINIC 8 | Facility: CLINIC | Age: 64
End: 2024-08-13

## 2024-08-13 DIAGNOSIS — H40.013: ICD-10-CM

## 2024-08-13 DIAGNOSIS — H34.8320: ICD-10-CM

## 2024-08-13 DIAGNOSIS — H35.372: ICD-10-CM

## 2024-08-13 DIAGNOSIS — H43.813: ICD-10-CM

## 2024-08-13 DIAGNOSIS — H52.13: ICD-10-CM

## 2024-08-13 DIAGNOSIS — H25.13: ICD-10-CM

## 2024-08-13 PROCEDURE — 92134 CPTRZ OPH DX IMG PST SGM RTA: CPT

## 2024-08-13 PROCEDURE — 92012 INTRM OPH EXAM EST PATIENT: CPT

## 2024-08-13 PROCEDURE — 92202 OPSCPY EXTND ON/MAC DRAW: CPT

## 2024-08-13 ASSESSMENT — TONOMETRY
OD_IOP_MMHG: 16
OS_IOP_MMHG: 17

## 2024-08-13 ASSESSMENT — VISUAL ACUITY
OS_CC: 20/30+2
OD_CC: 20/60-2

## 2024-11-06 ENCOUNTER — PROBLEM (OUTPATIENT)
Dept: URBAN - METROPOLITAN AREA CLINIC 48 | Facility: CLINIC | Age: 64
End: 2024-11-06

## 2024-11-06 DIAGNOSIS — H40.023: ICD-10-CM

## 2024-11-06 DIAGNOSIS — H35.032: ICD-10-CM

## 2024-11-06 DIAGNOSIS — H43.813: ICD-10-CM

## 2024-11-06 DIAGNOSIS — H25.13: ICD-10-CM

## 2024-11-06 DIAGNOSIS — H35.372: ICD-10-CM

## 2024-11-06 DIAGNOSIS — H40.033: ICD-10-CM

## 2024-11-06 PROCEDURE — 92133 CPTRZD OPH DX IMG PST SGM ON: CPT

## 2024-11-06 PROCEDURE — 92020 GONIOSCOPY: CPT

## 2024-11-06 PROCEDURE — 99214 OFFICE O/P EST MOD 30 MIN: CPT

## 2024-11-06 PROCEDURE — 92134 CPTRZ OPH DX IMG PST SGM RTA: CPT | Mod: NC

## 2024-11-06 ASSESSMENT — TONOMETRY
OD_IOP_MMHG: 15
OS_IOP_MMHG: 18

## 2024-11-06 ASSESSMENT — VISUAL ACUITY
OD_CC: 20/100+1
OS_CC: 20/40+1

## 2024-12-20 ENCOUNTER — PRE-OP CATARACT MEASUREMENTS (OUTPATIENT)
Dept: URBAN - METROPOLITAN AREA CLINIC 48 | Facility: CLINIC | Age: 64
End: 2024-12-20

## 2024-12-20 DIAGNOSIS — H25.13: ICD-10-CM

## 2024-12-20 PROCEDURE — 92136 OPHTHALMIC BIOMETRY: CPT | Mod: NC,RT

## 2024-12-20 PROCEDURE — 92025 CPTRIZED CORNEAL TOPOGRAPHY: CPT

## 2025-01-08 ENCOUNTER — PRE-OP/ASCAN (OUTPATIENT)
Dept: URBAN - METROPOLITAN AREA CLINIC 48 | Facility: CLINIC | Age: 65
End: 2025-01-08

## 2025-01-08 DIAGNOSIS — H25.13: ICD-10-CM

## 2025-01-08 PROCEDURE — 92012 INTRM OPH EXAM EST PATIENT: CPT

## 2025-01-08 ASSESSMENT — VISUAL ACUITY
OS_CC: 20/30
OD_PH: 20/70
OD_CC: 20/100

## 2025-01-08 ASSESSMENT — TONOMETRY
OD_IOP_MMHG: 16
OS_IOP_MMHG: 16

## 2025-01-22 ENCOUNTER — SURGERY/PROCEDURE (OUTPATIENT)
Dept: URBAN - METROPOLITAN AREA SURGICAL CENTER 54 | Facility: SURGICAL CENTER | Age: 65
End: 2025-01-22

## 2025-01-22 DIAGNOSIS — H25.11: ICD-10-CM

## 2025-01-22 PROCEDURE — MISCBLENDED MISCBLENDED: Mod: NC,RT

## 2025-01-22 PROCEDURE — MISCTIOL MISCTIOL

## 2025-01-22 PROCEDURE — 66984 XCAPSL CTRC RMVL W/O ECP: CPT

## 2025-01-23 ENCOUNTER — 1 DAY POST-OP (OUTPATIENT)
Dept: URBAN - METROPOLITAN AREA CLINIC 48 | Facility: CLINIC | Age: 65
End: 2025-01-23

## 2025-01-23 DIAGNOSIS — Z96.1: ICD-10-CM

## 2025-01-23 PROCEDURE — 99024 POSTOP FOLLOW-UP VISIT: CPT

## 2025-01-23 ASSESSMENT — TONOMETRY
OD_IOP_MMHG: 24
OD_IOP_MMHG: 22
OS_IOP_MMHG: 16

## 2025-01-23 ASSESSMENT — VISUAL ACUITY
OS_SC: 20/400
OD_SC: 20/25-2

## 2025-01-29 ENCOUNTER — PRE-OP/ASCAN (OUTPATIENT)
Dept: URBAN - METROPOLITAN AREA CLINIC 48 | Facility: CLINIC | Age: 65
End: 2025-01-29

## 2025-01-29 DIAGNOSIS — H25.12: ICD-10-CM

## 2025-01-29 DIAGNOSIS — Z96.1: ICD-10-CM

## 2025-01-29 PROCEDURE — 92136 OPHTHALMIC BIOMETRY: CPT | Mod: 26,LT

## 2025-01-29 PROCEDURE — 92012 INTRM OPH EXAM EST PATIENT: CPT | Mod: 24

## 2025-01-29 ASSESSMENT — TONOMETRY
OS_IOP_MMHG: 18
OD_IOP_MMHG: 18

## 2025-01-29 ASSESSMENT — VISUAL ACUITY
OS_CC: 20/30+2
OD_SC: 20/20-1
OD_SC: 20/30

## 2025-02-12 ENCOUNTER — SURGERY/PROCEDURE (OUTPATIENT)
Dept: URBAN - METROPOLITAN AREA SURGICAL CENTER 54 | Facility: SURGICAL CENTER | Age: 65
End: 2025-02-12

## 2025-02-12 DIAGNOSIS — H25.12: ICD-10-CM

## 2025-02-12 PROCEDURE — MISCTIOL MISCTIOL

## 2025-02-12 PROCEDURE — 66984 XCAPSL CTRC RMVL W/O ECP: CPT | Mod: 79,LT

## 2025-02-13 ENCOUNTER — 1 DAY POST-OP (OUTPATIENT)
Dept: URBAN - METROPOLITAN AREA CLINIC 48 | Facility: CLINIC | Age: 65
End: 2025-02-13

## 2025-02-13 DIAGNOSIS — Z96.1: ICD-10-CM

## 2025-02-13 PROCEDURE — 99024 POSTOP FOLLOW-UP VISIT: CPT

## 2025-02-13 ASSESSMENT — VISUAL ACUITY
OD_SC: 20/25-2
OS_PH: 20/30+2
OS_SC: 20/50

## 2025-02-13 ASSESSMENT — TONOMETRY
OD_IOP_MMHG: 19
OS_IOP_MMHG: 21

## 2025-02-19 ENCOUNTER — 1 WEEK POST-OP (OUTPATIENT)
Dept: URBAN - METROPOLITAN AREA CLINIC 48 | Facility: CLINIC | Age: 65
End: 2025-02-19

## 2025-02-19 DIAGNOSIS — Z96.1: ICD-10-CM

## 2025-02-19 PROCEDURE — 99024 POSTOP FOLLOW-UP VISIT: CPT

## 2025-02-19 ASSESSMENT — VISUAL ACUITY
OS_SC: 20/50
OD_SC: 20/20-2

## 2025-02-19 ASSESSMENT — TONOMETRY
OS_IOP_MMHG: 16
OD_IOP_MMHG: 18

## 2025-03-13 ENCOUNTER — POST-OP CHECK (OUTPATIENT)
Dept: URBAN - METROPOLITAN AREA CLINIC 48 | Facility: CLINIC | Age: 65
End: 2025-03-13

## 2025-03-13 DIAGNOSIS — Z96.1: ICD-10-CM

## 2025-03-13 PROCEDURE — 99024 POSTOP FOLLOW-UP VISIT: CPT

## 2025-03-13 ASSESSMENT — TONOMETRY
OD_IOP_MMHG: 15
OS_IOP_MMHG: 16

## 2025-03-13 ASSESSMENT — VISUAL ACUITY
OD_CC: 20/25
OS_SC: 20/50
OS_PH: 20/25-2
OD_SC: 20/25+2
OS_CC: 20/25

## 2025-04-03 ENCOUNTER — FOLLOW UP (OUTPATIENT)
Dept: URBAN - METROPOLITAN AREA CLINIC 46 | Age: 65
End: 2025-04-03

## 2025-04-03 DIAGNOSIS — H43.813: ICD-10-CM

## 2025-04-03 DIAGNOSIS — H34.8320: ICD-10-CM

## 2025-04-03 DIAGNOSIS — H35.372: ICD-10-CM

## 2025-04-03 DIAGNOSIS — Z96.1: ICD-10-CM

## 2025-04-03 DIAGNOSIS — H40.013: ICD-10-CM

## 2025-04-03 PROCEDURE — 92014 COMPRE OPH EXAM EST PT 1/>: CPT

## 2025-04-03 PROCEDURE — 92202 OPSCPY EXTND ON/MAC DRAW: CPT

## 2025-04-03 PROCEDURE — 92134 CPTRZ OPH DX IMG PST SGM RTA: CPT

## 2025-04-03 ASSESSMENT — TONOMETRY
OS_IOP_MMHG: 12
OD_IOP_MMHG: 15

## 2025-04-03 ASSESSMENT — VISUAL ACUITY
OS_PH: 20/20-1
OS_SC: 20/40+2
OD_SC: 20/20-2

## 2025-06-11 ENCOUNTER — IOP CHECK (OUTPATIENT)
Dept: URBAN - METROPOLITAN AREA CLINIC 48 | Facility: CLINIC | Age: 65
End: 2025-06-11

## 2025-06-11 DIAGNOSIS — H04.123: ICD-10-CM

## 2025-06-11 DIAGNOSIS — H10.45: ICD-10-CM

## 2025-06-11 DIAGNOSIS — H40.023: ICD-10-CM

## 2025-06-11 PROCEDURE — 92012 INTRM OPH EXAM EST PATIENT: CPT

## 2025-06-11 PROCEDURE — 92133 CPTRZD OPH DX IMG PST SGM ON: CPT

## 2025-06-11 ASSESSMENT — TONOMETRY
OD_IOP_MMHG: 13
OS_IOP_MMHG: 12

## 2025-06-11 ASSESSMENT — VISUAL ACUITY
OS_PH: 20/20
OD_SC: 20/20-2
OS_SC: 20/40